# Patient Record
Sex: FEMALE | Race: OTHER | HISPANIC OR LATINO
[De-identification: names, ages, dates, MRNs, and addresses within clinical notes are randomized per-mention and may not be internally consistent; named-entity substitution may affect disease eponyms.]

---

## 2018-12-08 ENCOUNTER — APPOINTMENT (OUTPATIENT)
Dept: MAMMOGRAPHY | Facility: CLINIC | Age: 48
End: 2018-12-08
Payer: COMMERCIAL

## 2018-12-08 ENCOUNTER — OUTPATIENT (OUTPATIENT)
Dept: OUTPATIENT SERVICES | Facility: HOSPITAL | Age: 48
LOS: 1 days | End: 2018-12-08
Payer: COMMERCIAL

## 2018-12-08 DIAGNOSIS — Z00.8 ENCOUNTER FOR OTHER GENERAL EXAMINATION: ICD-10-CM

## 2018-12-08 PROCEDURE — 77063 BREAST TOMOSYNTHESIS BI: CPT | Mod: 26

## 2018-12-08 PROCEDURE — 77067 SCR MAMMO BI INCL CAD: CPT

## 2018-12-08 PROCEDURE — 77063 BREAST TOMOSYNTHESIS BI: CPT

## 2018-12-08 PROCEDURE — 77067 SCR MAMMO BI INCL CAD: CPT | Mod: 26

## 2021-04-06 ENCOUNTER — APPOINTMENT (OUTPATIENT)
Dept: MAMMOGRAPHY | Facility: CLINIC | Age: 51
End: 2021-04-06
Payer: COMMERCIAL

## 2021-04-06 ENCOUNTER — OUTPATIENT (OUTPATIENT)
Dept: OUTPATIENT SERVICES | Facility: HOSPITAL | Age: 51
LOS: 1 days | End: 2021-04-06
Payer: COMMERCIAL

## 2021-04-06 DIAGNOSIS — Z12.31 ENCOUNTER FOR SCREENING MAMMOGRAM FOR MALIGNANT NEOPLASM OF BREAST: ICD-10-CM

## 2021-04-06 DIAGNOSIS — Z00.8 ENCOUNTER FOR OTHER GENERAL EXAMINATION: ICD-10-CM

## 2021-04-06 PROCEDURE — 77067 SCR MAMMO BI INCL CAD: CPT

## 2021-04-06 PROCEDURE — 77067 SCR MAMMO BI INCL CAD: CPT | Mod: 26

## 2021-04-06 PROCEDURE — 77063 BREAST TOMOSYNTHESIS BI: CPT

## 2021-04-06 PROCEDURE — 77063 BREAST TOMOSYNTHESIS BI: CPT | Mod: 26

## 2023-01-31 ENCOUNTER — APPOINTMENT (OUTPATIENT)
Dept: MAMMOGRAPHY | Facility: CLINIC | Age: 53
End: 2023-01-31
Payer: COMMERCIAL

## 2023-01-31 ENCOUNTER — OUTPATIENT (OUTPATIENT)
Dept: OUTPATIENT SERVICES | Facility: HOSPITAL | Age: 53
LOS: 1 days | End: 2023-01-31
Payer: COMMERCIAL

## 2023-01-31 DIAGNOSIS — Z12.39 ENCOUNTER FOR OTHER SCREENING FOR MALIGNANT NEOPLASM OF BREAST: ICD-10-CM

## 2023-01-31 PROCEDURE — 77063 BREAST TOMOSYNTHESIS BI: CPT

## 2023-01-31 PROCEDURE — 77063 BREAST TOMOSYNTHESIS BI: CPT | Mod: 26

## 2023-01-31 PROCEDURE — 77067 SCR MAMMO BI INCL CAD: CPT

## 2023-01-31 PROCEDURE — 77067 SCR MAMMO BI INCL CAD: CPT | Mod: 26

## 2023-02-15 ENCOUNTER — APPOINTMENT (OUTPATIENT)
Dept: ULTRASOUND IMAGING | Facility: CLINIC | Age: 53
End: 2023-02-15
Payer: COMMERCIAL

## 2023-02-15 ENCOUNTER — APPOINTMENT (OUTPATIENT)
Dept: MAMMOGRAPHY | Facility: CLINIC | Age: 53
End: 2023-02-15
Payer: COMMERCIAL

## 2023-02-15 ENCOUNTER — OUTPATIENT (OUTPATIENT)
Dept: OUTPATIENT SERVICES | Facility: HOSPITAL | Age: 53
LOS: 1 days | End: 2023-02-15
Payer: COMMERCIAL

## 2023-02-15 DIAGNOSIS — R92.8 OTHER ABNORMAL AND INCONCLUSIVE FINDINGS ON DIAGNOSTIC IMAGING OF BREAST: ICD-10-CM

## 2023-02-15 PROCEDURE — G0279: CPT

## 2023-02-15 PROCEDURE — 77065 DX MAMMO INCL CAD UNI: CPT | Mod: 26,LT

## 2023-02-15 PROCEDURE — G0279: CPT | Mod: 26

## 2023-02-15 PROCEDURE — 77065 DX MAMMO INCL CAD UNI: CPT

## 2023-02-15 PROCEDURE — 76642 ULTRASOUND BREAST LIMITED: CPT

## 2023-02-15 PROCEDURE — 76642 ULTRASOUND BREAST LIMITED: CPT | Mod: 26,LT

## 2023-07-19 ENCOUNTER — EMERGENCY (EMERGENCY)
Facility: HOSPITAL | Age: 53
LOS: 1 days | Discharge: HOME | End: 2023-07-19
Attending: EMERGENCY MEDICINE
Payer: MEDICAID

## 2023-07-19 VITALS
HEART RATE: 84 BPM | DIASTOLIC BLOOD PRESSURE: 88 MMHG | TEMPERATURE: 98 F | HEIGHT: 64 IN | RESPIRATION RATE: 18 BRPM | SYSTOLIC BLOOD PRESSURE: 160 MMHG | OXYGEN SATURATION: 99 % | WEIGHT: 175.05 LBS

## 2023-07-19 PROCEDURE — 99283 EMERGENCY DEPT VISIT LOW MDM: CPT

## 2023-07-19 RX ORDER — OXYCODONE AND ACETAMINOPHEN 5; 325 MG/1; MG/1
1 TABLET ORAL ONCE
Refills: 0 | Status: DISCONTINUED | OUTPATIENT
Start: 2023-07-19 | End: 2023-07-19

## 2023-07-19 NOTE — ED ADULT NURSE NOTE - NS ED NOTE  TALK SOMEONE YN
No Assistance with ambulation/Assistance OOB with selected safe patient handling equipment/Communicate Risk of Fall with Harm to all staff/Reinforce activity limits and safety measures with patient and family/Tailored Fall Risk Interventions/Visual Cue: Yellow wristband and red socks/Bed in lowest position, wheels locked, appropriate side rails in place/Call bell, personal items and telephone in reach/Instruct patient to call for assistance before getting out of bed or chair/Non-slip footwear when patient is out of bed/[place] to call system/Physically safe environment - no spills, clutter or unnecessary equipment/Purposeful Proactive Rounding/Room/bathroom lighting operational, light cord in reach

## 2023-07-19 NOTE — ED ADULT NURSE NOTE - OBJECTIVE STATEMENT
51 y/o female presents to ED c/o right great toe pain. pt says they got a pedicure on sunday and they nail person attempted to remove and ingrown toe nail. +"pulsing" pain +erythema +swelling, scab noted to outer toe nail.  took advil at home, helped little but no long as per pt.   denies fever/chill, cp, sob, n/v/d, change in mental status.  pt moved into producre room for I&D  pmhx: none   NKA

## 2023-07-19 NOTE — ED ADULT NURSE NOTE - NSFALLUNIVINTERV_ED_ALL_ED
Bed/Stretcher in lowest position, wheels locked, appropriate side rails in place/Call bell, personal items and telephone in reach/Instruct patient to call for assistance before getting out of bed/chair/stretcher/Non-slip footwear applied when patient is off stretcher/[place] to call system/Physically safe environment - no spills, clutter or unnecessary equipment/Purposeful proactive rounding/Room/bathroom lighting operational, light cord in reach

## 2023-07-19 NOTE — ED ADULT TRIAGE NOTE - CHIEF COMPLAINT QUOTE
pt states she was at [facility] Sunday, now has rt great toe infection+ drainage, + wound noted

## 2023-07-20 PROCEDURE — 99283 EMERGENCY DEPT VISIT LOW MDM: CPT

## 2023-07-20 RX ORDER — OXYCODONE AND ACETAMINOPHEN 5; 325 MG/1; MG/1
1 TABLET ORAL
Qty: 10 | Refills: 0
Start: 2023-07-20 | End: 2023-07-23

## 2023-07-20 RX ADMIN — OXYCODONE AND ACETAMINOPHEN 1 TABLET(S): 5; 325 TABLET ORAL at 00:00

## 2023-07-20 RX ADMIN — Medication 1 TABLET(S): at 00:00

## 2023-07-20 NOTE — ED PROVIDER NOTE - CARE PLAN
Principal Discharge DX:	Ingrown right big toenail  Secondary Diagnosis:	Cellulitis of great toe, right   1

## 2023-07-20 NOTE — ED PROVIDER NOTE - PATIENT PORTAL LINK FT
You can access the FollowMyHealth Patient Portal offered by [facility] by registering at the following website: [email or web address]. By joining [employer]’s FollowMyHealth portal, you will also be able to view your health information using other applications (apps) compatible with our system.

## 2023-07-20 NOTE — ED PROVIDER NOTE - CLINICAL SUMMARY MEDICAL DECISION MAKING FREE TEXT BOX
attempted to incise area as clinically edematous around nail bed suggested paronychia, no pus obtained. Extracted small portion of nail, stable for dc with PO abx and podiatry f/u

## 2023-07-20 NOTE — ED PROVIDER NOTE - CARE PROVIDER_API CALL
Harvey Cheng  Podiatric Medicine and Surgery  [address], [address]  [place] [address]  Phone: [phone]  Fax: [phone]  Follow Up Time:

## 2023-07-20 NOTE — ED PROVIDER NOTE - OBJECTIVE STATEMENT
52 year old female with no PMH presents with R great toe swelling. Pt states that she got a pedicure and then started ot notice redness and swelling to the area yesterday. Swelling worsened today, she reports throbbing pain, no fevers, discharge

## 2023-07-20 NOTE — ED PROVIDER NOTE - MUSCULOSKELETAL, MLM
Spine appears normal, range of motion is not limited, no muscle or joint tenderness. +edema, erythema and ttp to the R great toe along the nailbed

## 2024-05-08 ENCOUNTER — APPOINTMENT (OUTPATIENT)
Dept: MAMMOGRAPHY | Facility: CLINIC | Age: 54
End: 2024-05-08
Payer: COMMERCIAL

## 2024-05-08 ENCOUNTER — OUTPATIENT (OUTPATIENT)
Dept: OUTPATIENT SERVICES | Facility: HOSPITAL | Age: 54
LOS: 1 days | End: 2024-05-08
Payer: COMMERCIAL

## 2024-05-08 DIAGNOSIS — Z00.8 ENCOUNTER FOR OTHER GENERAL EXAMINATION: ICD-10-CM

## 2024-05-08 PROCEDURE — 77063 BREAST TOMOSYNTHESIS BI: CPT | Mod: 26

## 2024-05-08 PROCEDURE — 77067 SCR MAMMO BI INCL CAD: CPT

## 2024-05-08 PROCEDURE — 77067 SCR MAMMO BI INCL CAD: CPT | Mod: 26

## 2024-05-08 PROCEDURE — 77063 BREAST TOMOSYNTHESIS BI: CPT

## 2024-06-20 ENCOUNTER — EMERGENCY (EMERGENCY)
Facility: HOSPITAL | Age: 54
LOS: 1 days | Discharge: HOME | End: 2024-06-20
Attending: EMERGENCY MEDICINE
Payer: SELF-PAY

## 2024-06-20 VITALS
TEMPERATURE: 98 F | RESPIRATION RATE: 16 BRPM | DIASTOLIC BLOOD PRESSURE: 60 MMHG | HEART RATE: 60 BPM | OXYGEN SATURATION: 99 % | SYSTOLIC BLOOD PRESSURE: 143 MMHG

## 2024-06-20 VITALS
WEIGHT: 182.1 LBS | SYSTOLIC BLOOD PRESSURE: 133 MMHG | RESPIRATION RATE: 16 BRPM | TEMPERATURE: 99 F | DIASTOLIC BLOOD PRESSURE: 61 MMHG | OXYGEN SATURATION: 99 % | HEART RATE: 76 BPM

## 2024-06-20 LAB
ANION GAP SERPL CALC-SCNC: 17 MMOL/L — SIGNIFICANT CHANGE UP (ref 5–17)
BASOPHILS # BLD AUTO: 0.03 K/UL — SIGNIFICANT CHANGE UP (ref 0–0.2)
BASOPHILS NFR BLD AUTO: 0.4 % — SIGNIFICANT CHANGE UP (ref 0–2)
BUN SERPL-MCNC: 13.2 MG/DL — SIGNIFICANT CHANGE UP (ref 8–20)
CALCIUM SERPL-MCNC: 9.5 MG/DL — SIGNIFICANT CHANGE UP (ref 8.4–10.5)
CHLORIDE SERPL-SCNC: 102 MMOL/L — SIGNIFICANT CHANGE UP (ref 96–108)
CO2 SERPL-SCNC: 20 MMOL/L — LOW (ref 22–29)
CREAT SERPL-MCNC: 0.86 MG/DL — SIGNIFICANT CHANGE UP (ref 0.5–1.3)
EGFR: 82 ML/MIN/1.73M2 — SIGNIFICANT CHANGE UP
EOSINOPHIL # BLD AUTO: 0.13 K/UL — SIGNIFICANT CHANGE UP (ref 0–0.5)
EOSINOPHIL NFR BLD AUTO: 1.6 % — SIGNIFICANT CHANGE UP (ref 0–6)
GLUCOSE SERPL-MCNC: 101 MG/DL — HIGH (ref 70–99)
HCG SERPL-ACNC: <4 MIU/ML — SIGNIFICANT CHANGE UP
HCT VFR BLD CALC: 38.1 % — SIGNIFICANT CHANGE UP (ref 34.5–45)
HGB BLD-MCNC: 12.9 G/DL — SIGNIFICANT CHANGE UP (ref 11.5–15.5)
IMM GRANULOCYTES NFR BLD AUTO: 0.1 % — SIGNIFICANT CHANGE UP (ref 0–0.9)
LYMPHOCYTES # BLD AUTO: 3.38 K/UL — HIGH (ref 1–3.3)
LYMPHOCYTES # BLD AUTO: 41.2 % — SIGNIFICANT CHANGE UP (ref 13–44)
MCHC RBC-ENTMCNC: 30.7 PG — SIGNIFICANT CHANGE UP (ref 27–34)
MCHC RBC-ENTMCNC: 33.9 GM/DL — SIGNIFICANT CHANGE UP (ref 32–36)
MCV RBC AUTO: 90.7 FL — SIGNIFICANT CHANGE UP (ref 80–100)
MONOCYTES # BLD AUTO: 0.77 K/UL — SIGNIFICANT CHANGE UP (ref 0–0.9)
MONOCYTES NFR BLD AUTO: 9.4 % — SIGNIFICANT CHANGE UP (ref 2–14)
NEUTROPHILS # BLD AUTO: 3.89 K/UL — SIGNIFICANT CHANGE UP (ref 1.8–7.4)
NEUTROPHILS NFR BLD AUTO: 47.3 % — SIGNIFICANT CHANGE UP (ref 43–77)
PLATELET # BLD AUTO: 222 K/UL — SIGNIFICANT CHANGE UP (ref 150–400)
POTASSIUM SERPL-MCNC: 4 MMOL/L — SIGNIFICANT CHANGE UP (ref 3.5–5.3)
POTASSIUM SERPL-SCNC: 4 MMOL/L — SIGNIFICANT CHANGE UP (ref 3.5–5.3)
RBC # BLD: 4.2 M/UL — SIGNIFICANT CHANGE UP (ref 3.8–5.2)
RBC # FLD: 12.9 % — SIGNIFICANT CHANGE UP (ref 10.3–14.5)
SODIUM SERPL-SCNC: 139 MMOL/L — SIGNIFICANT CHANGE UP (ref 135–145)
TROPONIN T, HIGH SENSITIVITY RESULT: 6 NG/L — SIGNIFICANT CHANGE UP (ref 0–51)
WBC # BLD: 8.21 K/UL — SIGNIFICANT CHANGE UP (ref 3.8–10.5)
WBC # FLD AUTO: 8.21 K/UL — SIGNIFICANT CHANGE UP (ref 3.8–10.5)

## 2024-06-20 PROCEDURE — 99285 EMERGENCY DEPT VISIT HI MDM: CPT | Mod: 25

## 2024-06-20 PROCEDURE — 71045 X-RAY EXAM CHEST 1 VIEW: CPT

## 2024-06-20 PROCEDURE — 80048 BASIC METABOLIC PNL TOTAL CA: CPT

## 2024-06-20 PROCEDURE — T1013: CPT

## 2024-06-20 PROCEDURE — 84484 ASSAY OF TROPONIN QUANT: CPT

## 2024-06-20 PROCEDURE — 85025 COMPLETE CBC W/AUTO DIFF WBC: CPT

## 2024-06-20 PROCEDURE — 99285 EMERGENCY DEPT VISIT HI MDM: CPT

## 2024-06-20 PROCEDURE — 36415 COLL VENOUS BLD VENIPUNCTURE: CPT

## 2024-06-20 PROCEDURE — 93010 ELECTROCARDIOGRAM REPORT: CPT

## 2024-06-20 PROCEDURE — 84702 CHORIONIC GONADOTROPIN TEST: CPT

## 2024-06-20 PROCEDURE — 71045 X-RAY EXAM CHEST 1 VIEW: CPT | Mod: 26

## 2024-06-20 PROCEDURE — 93005 ELECTROCARDIOGRAM TRACING: CPT

## 2024-06-20 NOTE — ED PROVIDER NOTE - ATTENDING CONTRIBUTION TO CARE
I performed a face to face bedside interview with patient regarding history of present illness, review of symptoms and past medical history. I completed an independent physical exam.  I have discussed patient's plan of care with resident.   I agree with note as stated above including HISTORY OF PRESENT ILLNESS, HIV, PAST MEDICAL/SURGICAL/FAMILY/SOCIAL HISTORY, ALLERGIES AND HOME MEDICATIONS, REVIEW OF SYSTEMS, PHYSICAL EXAM, MEDICAL DECISION MAKING and any PROGRESS NOTES during the time I functioned as the attending physician for this patient unless otherwise noted. My brief assessment is as follows:   General in no acute distress respiratory clear cardiac no murmur abdomen soft neuro intact   agree with present plan of care

## 2024-06-20 NOTE — ED PROVIDER NOTE - PATIENT PORTAL LINK FT
You can access the FollowMyHealth Patient Portal offered by [facility] by registering at the following website: [email or web address]. By joining [email or web address]’s FollowMyHealth portal, you will also be able to view your health information using other applications (apps) compatible with our system.

## 2024-06-20 NOTE — ED PROVIDER NOTE - PROGRESS NOTE DETAILS
Pt feels well. Updated pt regarding workup results. Instructed pt to follow up with the provided cardiologist, return precautions discussed, pt demonstrated understanding. Manuel Walton MD

## 2024-06-20 NOTE — ED PROVIDER NOTE - NSFOLLOWUPINSTRUCTIONS_ED_ALL_ED_FT
Dolor en el pecho    El dolor de pecho puede ser causado por muchas condiciones diferentes que pueden ser peligrosas o no. Las causas incluyen acidez de estómago, infecciones pulmonares, ataques cardíacos, coágulos de cory en los pulmones, infecciones de la piel, tensión o daño a los músculos, cartílagos o huesos, etc. Además de los antecedentes y el examen físico, es posible que se realice un electrocardiograma (ECG) u otras pruebas de laboratorio. Se otero realizado pruebas para determinar la causa de johnston dolor en el pecho. Mac un seguimiento con johnston proveedor de atención primaria o con un cardiólogo según las instrucciones.    BUSQUE ATENCIÓN MÉDICA INMEDIATA SI TIENE ALGUNO DE LOS SIGUIENTES SÍNTOMAS: empeoramiento del dolor en el pecho, tos con cory, dolor inexplicable de espalda/darek/mandíbula, dolor abdominal intenso, mareos o aturdimiento, desmayos, dificultad para respirar, piel sudorosa o húmeda, vómitos, o latidos cardíacos acelerados. Estos síntomas pueden representar un problema grave que constituye tosha emergencia. No espere a temo si los síntomas desaparecen. Obtenga ayuda médica de inmediato. Llame al 911 y no conduzca hasta el hospital.

## 2024-06-20 NOTE — ED ADULT NURSE NOTE - OBJECTIVE STATEMENT
A&OX4, Presents to ed with complaints of left sided chest pain, reports having this pain a month ago and it subsided. Patient denies sob. Denies nausea, dizziness, fever, chills. Ekg done upon arrival .  EKG in triage

## 2024-06-20 NOTE — ED PROVIDER NOTE - CLINICAL SUMMARY MEDICAL DECISION MAKING FREE TEXT BOX
53-year-old otherwise healthy female presents with left-sided chest pain which began earlier this morning.  Chest pain is nonspecific in nature.  Will evaluate for cardiac etiology with blood work and CXR.  EKG nonischemic at this time.  If unremarkable patient will be provided a cardiologist to follow-up with outpatient.

## 2024-06-20 NOTE — ED PROVIDER NOTE - OBJECTIVE STATEMENT
53-year-old otherwise healthy female presents with left-sided chest pain which began earlier this morning.  Pain is nonexertional, nonradiating.  No associated diaphoresis, vomiting, SOB, fever, cough.  No recent travel or leg pain/swelling.  No known family history of premature cardiac disease.  Non-smoker.  Has never seen a cardiologist

## 2024-06-20 NOTE — ED PROVIDER NOTE - CARE PROVIDER_API CALL
Hood Hernandez  Cardiovascular Disease  [address]  [place] [address]  Phone: [phone]  Fax: [phone]  Follow Up Time: 7-10 Days

## 2024-06-20 NOTE — ED ADULT TRIAGE NOTE - CHIEF COMPLAINT QUOTE
Pt c/o left sided CP radiates up left side of neck, left upper back and left arm, onset this morning w/nausea.  Denies SOB/visual changes/numbness/tingling.  Pt states she had similar sxs 1 month ago, but did not seek help.  No pertinent medical hx.  EKG in triage

## 2024-11-03 ENCOUNTER — EMERGENCY (EMERGENCY)
Facility: HOSPITAL | Age: 54
LOS: 1 days | Discharge: HOME | End: 2024-11-03
Attending: EMERGENCY MEDICINE
Payer: MEDICAID

## 2024-11-03 VITALS
DIASTOLIC BLOOD PRESSURE: 85 MMHG | WEIGHT: 168.43 LBS | SYSTOLIC BLOOD PRESSURE: 156 MMHG | RESPIRATION RATE: 16 BRPM | HEART RATE: 73 BPM | HEIGHT: 61 IN | TEMPERATURE: 98 F | OXYGEN SATURATION: 96 %

## 2024-11-03 PROCEDURE — T1013: CPT

## 2024-11-03 PROCEDURE — 93010 ELECTROCARDIOGRAM REPORT: CPT

## 2024-11-03 PROCEDURE — 71046 X-RAY EXAM CHEST 2 VIEWS: CPT

## 2024-11-03 PROCEDURE — 71046 X-RAY EXAM CHEST 2 VIEWS: CPT | Mod: 26

## 2024-11-03 PROCEDURE — 99284 EMERGENCY DEPT VISIT MOD MDM: CPT

## 2024-11-03 PROCEDURE — 99283 EMERGENCY DEPT VISIT LOW MDM: CPT | Mod: 25

## 2024-11-03 PROCEDURE — 93005 ELECTROCARDIOGRAM TRACING: CPT

## 2025-02-16 ENCOUNTER — EMERGENCY (EMERGENCY)
Facility: HOSPITAL | Age: 55
LOS: 1 days | Discharge: HOME | End: 2025-02-16
Attending: STUDENT IN AN ORGANIZED HEALTH CARE EDUCATION/TRAINING PROGRAM
Payer: SELF-PAY

## 2025-02-16 VITALS
WEIGHT: 190.7 LBS | RESPIRATION RATE: 16 BRPM | DIASTOLIC BLOOD PRESSURE: 62 MMHG | TEMPERATURE: 99 F | HEART RATE: 77 BPM | SYSTOLIC BLOOD PRESSURE: 120 MMHG | OXYGEN SATURATION: 98 %

## 2025-02-16 PROCEDURE — 99284 EMERGENCY DEPT VISIT MOD MDM: CPT
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
## 2025-02-17 VITALS
RESPIRATION RATE: 18 BRPM | HEART RATE: 72 BPM | SYSTOLIC BLOOD PRESSURE: 108 MMHG | DIASTOLIC BLOOD PRESSURE: 72 MMHG | TEMPERATURE: 98 F | OXYGEN SATURATION: 98 %

## 2025-02-17 PROCEDURE — 93010 ELECTROCARDIOGRAM REPORT: CPT

## 2025-02-17 PROCEDURE — 71046 X-RAY EXAM CHEST 2 VIEWS: CPT

## 2025-02-17 PROCEDURE — 71046 X-RAY EXAM CHEST 2 VIEWS: CPT | Mod: 26

## 2025-02-17 PROCEDURE — 93005 ELECTROCARDIOGRAM TRACING: CPT

## 2025-02-17 PROCEDURE — T1013: CPT

## 2025-02-17 PROCEDURE — 99284 EMERGENCY DEPT VISIT MOD MDM: CPT | Mod: 25

## 2025-02-17 RX ORDER — METHOCARBAMOL 500 MG
1 TABLET ORAL
Qty: 20 | Refills: 0
Start: 2025-02-17

## 2025-02-17 RX ORDER — METHOCARBAMOL 500 MG
750 TABLET ORAL ONCE
Refills: 0 | Status: COMPLETED | OUTPATIENT
Start: 2025-02-17 | End: 2025-02-17

## 2025-02-17 RX ORDER — IBUPROFEN 600 MG/1
600 TABLET, FILM COATED ORAL ONCE
Refills: 0 | Status: COMPLETED | OUTPATIENT
Start: 2025-02-17 | End: 2025-02-17

## 2025-02-17 RX ADMIN — IBUPROFEN 600 MILLIGRAM(S): 600 TABLET, FILM COATED ORAL at 00:23

## 2025-02-17 RX ADMIN — Medication 750 MILLIGRAM(S): at 00:23

## 2025-02-17 NOTE — ED PROVIDER NOTE - PATIENT PORTAL LINK FT
You can access the FollowMyHealth Patient Portal offered by [facility] by registering at the following website: [email or web address]. By joining [employer]’s FollowMyHealth portal, you will also be able to view your health information using other applications (apps) compatible with our system.
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
## 2025-02-17 NOTE — ED PROVIDER NOTE - NSFOLLOWUPINSTRUCTIONS_ED_ALL_ED_FT
Hematoma  Hematoma  Un hematoma es tosha acumulación de cory debajo de la piel, en un órgano, en un sitio del cuerpo, en un espacio articular o en otro tejido. La cory puede espesarse (coagularse) para formar un bulto que se puede temo y sentir. El bulto suele ser firme y puede ser doloroso y sensible. La mayoría de los hematomas mejoran en unos pocos días o semanas. Sin embargo, algunos hematomas pueden ser graves y requieren atención médica. Los hematomas pueden variar desde muy pequeños hasta muy grandes.    ¿Cuáles son las causas?  Las causas de esta afección son:  Tosha lesión cerrada o penetrante.  Pérdida de cory de un vaso sanguíneo bajo la piel.  Algunos procedimientos médicos, incluidas las cirugías, erin las cirugías bucales, las de eliminación de arrugas y las de articulaciones.  Algunas afecciones médicas que causan sangrado o moretones. Pueden aparecer varios hematomas en diferentes partes del cuerpo.  ¿Qué incrementa el riesgo?  Es más probable que contraiga esta afección si:  Es un adulto mayor.  Usa anticoagulantes.  Con regularidad, usa antiinflamatorios no esteroideos (NICK), erin ibuprofeno, para aliviar el dolor.  Practica deportes de contacto.  ¿Cuáles son los signos o síntomas?  Comparison of a normal ankle and an ankle that is swollen and bruised.  Los síntomas de esta afección dependen del lugar donde se encuentre el hematoma.    Los síntomas comunes de un hematoma que está debajo de la piel incluyen los siguientes:  Un bulto firme en el cuerpo.  Dolor y sensibilidad en la keagan.  Moretones. La piel puede tornarse de color enrique, pimentel púrpura o amarillo (coloración) en el lugar del hematoma si el está cerca de la superficie de la piel.  Los síntomas comunes de un hematoma que está en un lugar profundo de los tejidos o espacios corporales pueden ser menos evidentes. Estos incluyen los siguientes:  Tosha acumulación de cory en el estómago (hematoma intraabdominal). Valentine puede causar dolor en el abdomen, debilidad, desmayos y falta de aire.  Tosha acumulación de cory dentro de la gurmeet (hematoma intracraneal). Esta puede causar dolor de gurmeet o síntomas erin debilidad, dificultad para hablar o para entender, o un cambio en el estado de conciencia.  ¿Cómo se diagnostica?  Esta afección se diagnostica en función de lo siguiente:  Homa antecedentes médicos.  Un examen físico.  Estudios de diagnóstico por imágenes, erin tosha ecografía o tosha exploración por tomografía computarizada (TC). Valentine será necesario si el médico sospecha que hay un hematoma en tejidos o espacios corporales más profundos.  Análisis de cory. Puede que deban realizarle estos análisis si johnston médico josiane que el hematoma es la consecuencia de tosha afección.  ¿Cómo se trata?  El tratamiento de esta afección depende de la causa, del tamaño y de la ubicación del hematoma. El tratamiento puede incluir:  No hacer nada. La mayoría de los hematomas no necesitan tratamiento porque muchos de ellos desaparecen solos.  Cirugía o control minucioso. Valentine puede ser necesario para los hematomas grandes o los hematomas que afectan a los órganos vitales.  Medicamentos. Es posible que le den medicamentos si el hematoma tiene tosha causa médica subyacente.  Siga estas indicaciones en johnston casa:  Control del dolor, la rigidez y la hinchazón    Bag of ice on a towel on the skin.  Si se lo indican, aplique hielo sobre la keagan de la lesión. Para hacer esto:  Ponga el hielo en tosha bolsa plástica.  Coloque tosha toalla entre la piel y la bolsa.  Deje el hielo carleen 20 minutos, de 2 a 3 veces por día, carleen los primeros días.  Si la piel se le pone de color pimentel brillante, retire el hielo de inmediato para evitar daños en la piel. El riesgo de daño en la piel es mayor si no puede sentir dolor, calor o frío.  Si se lo indican, aplique calor en la keagan afectada con la frecuencia que le haya indicado el médico. Use la autumn de calor que el médico le recomiende, erin tosha compresa de calor húmedo o tosha almohadilla térmica.  Coloque tosha toalla entre la piel y la autumn de calor.  Aplique calor carleen 20 a 30 minutos.  Si la piel se le pone de color pimentel brillante, retire el calor de inmediato para evitar quemaduras. El riesgo de quemaduras es mayor si no puede sentir el dolor, el calor o el frío.  Cuando esté sentado o acostado, levante (eleve) la keagan lesionada por encima del nivel del corazón.  Si se lo indican, envuelva la keagan afectada con tosha venda elástica. La venda aplica presión (compresión) en la keagan, lo que puede ayudar a reducir la hinchazón y a promover la curación. No la ajuste demasiado alrededor de la keagan afectada.  Si el hematoma está en tosha pierna o un pie (extremidad inferior) y duele, puede que johnston médico le recomiende el uso de muletas. Úselas erin se lo haya indicado el médico.  Indicaciones generales    Use los medicamentos de venta veronica y los recetados solamente erin se lo haya indicado el médico.  Mac reposo para que descanse la keagan lesionada erin se lo haya indicado el médico.  Concurra a todas las visitas de seguimiento. Es posible que el médico desee temo cómo evoluciona el hematoma con el tratamiento.  Comuníquese con un médico si:  Tiene fiebre.  La hinchazón y la coloración empeoran.  Aparecen nuevos hematomas.  El dolor empeora o no se controla con los medicamentos.  La piel sobre el hematoma se agrieta o comienza a sangrar.  Solicite ayuda de inmediato si:  El hematoma se encuentra en el tórax o el abdomen y siente debilidad, le falta el aire o se altera johnston conocimiento.  Tiene un hematoma en el cuero cabelludo causado por tosha caída o tosha lesión y también tiene:  Un dolor de gurmeet que empeora.  Dificultad para hablar o comprender el lenguaje.  Debilidad.  Un cambio en el estado de alerta o en la conciencia.  Estos síntomas pueden indicar tosha emergencia. Solicite ayuda de inmediato. Llame al 911.  No espere a temo si los síntomas desaparecen.  No conduzca por homa propios medios hasta el hospital.  Esta información no tiene erin fin reemplazar el consejo del médico. Asegúrese de hacerle al médico cualquier pregunta que tenga. Hematoma    Un hematoma es tosha acumulación de cory debajo de la piel, en un órgano, en un sitio del cuerpo, en un espacio articular o en otro tejido. La cory puede espesarse (coagularse) para formar un bulto que se puede temo y sentir. El bulto suele ser firme y puede ser doloroso y sensible. La mayoría de los hematomas mejoran en unos pocos días o semanas. Sin embargo, algunos hematomas pueden ser graves y requieren atención médica. Los hematomas pueden variar desde muy pequeños hasta muy grandes.    ¿Cuáles son las causas?  Las causas de esta afección son:  Tosha lesión cerrada o penetrante.  Pérdida de cory de un vaso sanguíneo bajo la piel.  Algunos procedimientos médicos, incluidas las cirugías, erin las cirugías bucales, las de eliminación de arrugas y las de articulaciones.  Algunas afecciones médicas que causan sangrado o moretones. Pueden aparecer varios hematomas en diferentes partes del cuerpo.  ¿Qué incrementa el riesgo?  Es más probable que contraiga esta afección si:  Es un adulto mayor.  Usa anticoagulantes.  Con regularidad, usa antiinflamatorios no esteroideos (NICK), erin ibuprofeno, para aliviar el dolor.  Practica deportes de contacto.  ¿Cuáles son los signos o síntomas?  Comparison of a normal ankle and an ankle that is swollen and bruised.  Los síntomas de esta afección dependen del lugar donde se encuentre el hematoma.    Los síntomas comunes de un hematoma que está debajo de la piel incluyen los siguientes:  Un bulto firme en el cuerpo.  Dolor y sensibilidad en la keagan.  Moretones. La piel puede tornarse de color enrique, pimentel púrpura o amarillo (coloración) en el lugar del hematoma si el está cerca de la superficie de la piel.  Los síntomas comunes de un hematoma que está en un lugar profundo de los tejidos o espacios corporales pueden ser menos evidentes. Estos incluyen los siguientes:  Tosha acumulación de cory en el estómago (hematoma intraabdominal). Garber puede causar dolor en el abdomen, debilidad, desmayos y falta de aire.  Tosha acumulación de cory dentro de la gurmeet (hematoma intracraneal). Esta puede causar dolor de gurmeet o síntomas erin debilidad, dificultad para hablar o para entender, o un cambio en el estado de conciencia.  ¿Cómo se diagnostica?  Esta afección se diagnostica en función de lo siguiente:  Homa antecedentes médicos.  Un examen físico.  Estudios de diagnóstico por imágenes, erin tosha ecografía o tosha exploración por tomografía computarizada (TC). Garber será necesario si el médico sospecha que hay un hematoma en tejidos o espacios corporales más profundos.  Análisis de cory. Puede que deban realizarle estos análisis si johnston médico josiane que el hematoma es la consecuencia de tosha afección.  ¿Cómo se trata?  El tratamiento de esta afección depende de la causa, del tamaño y de la ubicación del hematoma. El tratamiento puede incluir:  No hacer nada. La mayoría de los hematomas no necesitan tratamiento porque muchos de ellos desaparecen solos.  Cirugía o control minucioso. [place] puede ser necesario para los hematomas grandes o los hematomas que afectan a los órganos vitales.  Medicamentos. Es posible que le den medicamentos si el hematoma tiene tosha causa médica subyacente.  Siga estas indicaciones en johnston casa:  Control del dolor, la rigidez y la hinchazón    Bag of ice on a towel on the skin.  Si se lo indican, aplique hielo sobre la keagan de la lesión. Para hacer esto:  Ponga el hielo en tosha bolsa plástica.  Coloque tosha toalla entre la piel y la bolsa.  Deje el hielo carleen 20 minutos, de 2 a 3 veces por día, carleen los primeros días.  Si la piel se le pone de color pimentel brillante, retire el hielo de inmediato para evitar daños en la piel. El riesgo de daño en la piel es mayor si no puede sentir dolor, calor o frío.  Si se lo indican, aplique calor en la keagan afectada con la frecuencia que le haya indicado el médico. Use la autumn de calor que el médico le recomiende, erin tosha compresa de calor húmedo o tosha almohadilla térmica.  Coloque tosha toalla entre la piel y la autumn de calor.  Aplique calor carleen 20 a 30 minutos.  Si la piel se le pone de color pimentel brillante, retire el calor de inmediato para evitar quemaduras. El riesgo de quemaduras es mayor si no puede sentir el dolor, el calor o el frío.  Cuando esté sentado o acostado, levante (eleve) la keagan lesionada por encima del nivel del corazón.  Si se lo indican, envuelva la keagan afectada con tosha venda elástica. La venda aplica presión (compresión) en la keagan, lo que puede ayudar a reducir la hinchazón y a promover la curación. No la ajuste demasiado alrededor de la keagan afectada.  Si el hematoma está en tosha pierna o un pie (extremidad inferior) y duele, puede que johnston médico le recomiende el uso de muletas. Úselas erin se lo haya indicado el médico.  Indicaciones generales    Use los medicamentos de venta veronica y los recetados solamente erin se lo haya indicado el médico.  Mac reposo para que descanse la keagan lesionada erin se lo haya indicado el médico.  Concurra a todas las visitas de seguimiento. Es posible que el médico desee temo cómo evoluciona el hematoma con el tratamiento.  Comuníquese con un médico si:  Tiene fiebre.  La hinchazón y la coloración empeoran.  Aparecen nuevos hematomas.  El dolor empeora o no se controla con los medicamentos.  La piel sobre el hematoma se agrieta o comienza a sangrar.  Solicite ayuda de inmediato si:  El hematoma se encuentra en el tórax o el abdomen y siente debilidad, le falta el aire o se altera johnston conocimiento.  Tiene un hematoma en el cuero cabelludo causado por tosha caída o tosha lesión y también tiene:  Un dolor de gurmeet que empeora.  Dificultad para hablar o comprender el lenguaje.  Debilidad.  Un cambio en el estado de alerta o en la conciencia.  Estos síntomas pueden indicar tosha emergencia. Solicite ayuda de inmediato. Llame al 911.  No espere a temo si los síntomas desaparecen.  No conduzca por homa propios medios hasta el hospital.  Esta información no tiene erin fin reemplazar el consejo del médico. Asegúrese de hacerle al médico cualquier pregunta que tenga.
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
## 2025-06-23 ENCOUNTER — APPOINTMENT (OUTPATIENT)
Dept: MAMMOGRAPHY | Facility: CLINIC | Age: 55
End: 2025-06-23

## 2025-07-26 ENCOUNTER — APPOINTMENT (OUTPATIENT)
Dept: MAMMOGRAPHY | Facility: CLINIC | Age: 55
End: 2025-07-26

## 2025-08-02 ENCOUNTER — OUTPATIENT (OUTPATIENT)
Dept: OUTPATIENT SERVICES | Facility: HOSPITAL | Age: 55
LOS: 1 days | End: 2025-08-02
Payer: COMMERCIAL

## 2025-08-02 ENCOUNTER — APPOINTMENT (OUTPATIENT)
Dept: MAMMOGRAPHY | Facility: CLINIC | Age: 55
End: 2025-08-02
Payer: COMMERCIAL

## 2025-08-02 DIAGNOSIS — Z00.8 ENCOUNTER FOR OTHER GENERAL EXAMINATION: ICD-10-CM

## 2025-08-02 PROCEDURE — 77067 SCR MAMMO BI INCL CAD: CPT | Mod: 26

## 2025-08-02 PROCEDURE — 77063 BREAST TOMOSYNTHESIS BI: CPT

## 2025-08-02 PROCEDURE — 77067 SCR MAMMO BI INCL CAD: CPT

## 2025-08-02 PROCEDURE — 77063 BREAST TOMOSYNTHESIS BI: CPT | Mod: 26
